# Patient Record
Sex: FEMALE
[De-identification: names, ages, dates, MRNs, and addresses within clinical notes are randomized per-mention and may not be internally consistent; named-entity substitution may affect disease eponyms.]

---

## 2024-04-10 PROBLEM — Z00.00 ENCOUNTER FOR PREVENTIVE HEALTH EXAMINATION: Status: ACTIVE | Noted: 2024-04-10

## 2024-04-15 PROBLEM — I10 HTN (HYPERTENSION): Status: ACTIVE | Noted: 2024-04-15

## 2024-04-15 PROBLEM — E55.9 VITAMIN D DEFICIENCY: Status: ACTIVE | Noted: 2024-04-15

## 2024-04-15 PROBLEM — R73.03 PRE-DIABETES: Status: ACTIVE | Noted: 2024-04-15

## 2024-04-15 PROBLEM — H26.9 CATARACT: Status: ACTIVE | Noted: 2024-04-15

## 2024-04-15 PROBLEM — K82.4 GALLBLADDER POLYP: Status: ACTIVE | Noted: 2024-04-15

## 2024-04-15 RX ORDER — BIOTIN 10000 MCG
10 CAPSULE ORAL
Refills: 0 | Status: ACTIVE | COMMUNITY

## 2024-04-15 RX ORDER — PENCICLOVIR 10 MG/G
1 CREAM TOPICAL
Refills: 0 | Status: ACTIVE | COMMUNITY

## 2024-04-15 RX ORDER — CHOLECALCIFEROL (VITAMIN D3) 1250 MCG
1.25 MG CAPSULE ORAL
Refills: 0 | Status: ACTIVE | COMMUNITY

## 2024-04-22 ENCOUNTER — APPOINTMENT (OUTPATIENT)
Dept: SURGICAL ONCOLOGY | Facility: CLINIC | Age: 64
End: 2024-04-22
Payer: COMMERCIAL

## 2024-04-22 DIAGNOSIS — E55.9 VITAMIN D DEFICIENCY, UNSPECIFIED: ICD-10-CM

## 2024-04-22 DIAGNOSIS — K82.4 CHOLESTEROLOSIS OF GALLBLADDER: ICD-10-CM

## 2024-04-22 DIAGNOSIS — R73.03 PREDIABETES.: ICD-10-CM

## 2024-04-22 DIAGNOSIS — H26.9 UNSPECIFIED CATARACT: ICD-10-CM

## 2024-04-22 DIAGNOSIS — I10 ESSENTIAL (PRIMARY) HYPERTENSION: ICD-10-CM

## 2024-04-22 PROBLEM — E78.5 HLD (HYPERLIPIDEMIA): Status: ACTIVE | Noted: 2024-04-22

## 2024-04-24 ENCOUNTER — APPOINTMENT (OUTPATIENT)
Dept: ENDOCRINOLOGY | Facility: CLINIC | Age: 64
End: 2024-04-24

## 2024-04-29 ENCOUNTER — APPOINTMENT (OUTPATIENT)
Dept: SURGICAL ONCOLOGY | Facility: CLINIC | Age: 64
End: 2024-04-29
Payer: COMMERCIAL

## 2024-04-29 DIAGNOSIS — E78.5 HYPERLIPIDEMIA, UNSPECIFIED: ICD-10-CM

## 2024-05-08 ENCOUNTER — APPOINTMENT (OUTPATIENT)
Dept: ENDOCRINOLOGY | Facility: CLINIC | Age: 64
End: 2024-05-08
Payer: COMMERCIAL

## 2024-05-08 VITALS
HEIGHT: 64 IN | HEART RATE: 69 BPM | BODY MASS INDEX: 22.02 KG/M2 | DIASTOLIC BLOOD PRESSURE: 74 MMHG | WEIGHT: 129 LBS | TEMPERATURE: 98.2 F | SYSTOLIC BLOOD PRESSURE: 131 MMHG | OXYGEN SATURATION: 96 %

## 2024-05-08 DIAGNOSIS — E04.1 NONTOXIC SINGLE THYROID NODULE: ICD-10-CM

## 2024-05-08 DIAGNOSIS — M81.0 AGE-RELATED OSTEOPOROSIS W/OUT CURRENT PATHOLOGICAL FRACTURE: ICD-10-CM

## 2024-05-08 DIAGNOSIS — Z13.820 ENCOUNTER FOR SCREENING FOR OSTEOPOROSIS: ICD-10-CM

## 2024-05-08 PROCEDURE — 99204 OFFICE O/P NEW MOD 45 MIN: CPT

## 2024-05-08 RX ORDER — CALCIUM CARBONATE/VITAMIN D3 600 MG-20
600-125 TABLET ORAL
Refills: 0 | Status: ACTIVE | COMMUNITY

## 2024-05-08 RX ORDER — RALOXIFENE HYDROCHLORIDE 60 MG/1
60 TABLET, FILM COATED ORAL
Refills: 0 | Status: DISCONTINUED | COMMUNITY
End: 2024-05-08

## 2024-05-08 NOTE — HISTORY OF PRESENT ILLNESS
[FreeTextEntry1] : 63 year old female here for evaluation of right sided thyroid nodule   Patient previously saw Dr. Antonieta Rodriguez. ( [facility])  She felt a lump in her neck in 2020 and then later had an US last in 2020. Most recent in 01/2024   Most recent US in from 01/2024:  0.9 cm solid isoechoic nodule in the posterior midpole  0.6 cm spongiform nodule in the lower pole      -She does have many family members with thyroid nodules- sister and mother  -No family history of thyroid cancer -No previous head or neck radiation exposure   -No compressive neck symptoms  TSH of 0.933, FT4: 1.05    Bone disease evaluation:  FRAX osteoporosis risk factors:   Hx of height loss? No Current smoker? No Previous fracture?  No FHx of hip fracture in parent? No Glucocorticoid use?  No Rheumatoid arthritis?  No Alcohol intake: No    Calcium intake: -Diet: Dairy products  -Supplement: Caltrate   Hormone-replacement therapy: No  LMP: age 50 Bone-active drugs: No Hx of kidney stones? No

## 2024-05-08 NOTE — PHYSICAL EXAM
[Alert] : alert [Well Nourished] : well nourished [No Acute Distress] : no acute distress [Normal Sclera/Conjunctiva] : normal sclera/conjunctiva [PERRL] : pupils equal, round and reactive to light [Normal Outer Ear/Nose] : the ears and nose were normal in appearance [Normal TMs] : both tympanic membranes were normal [No Neck Mass] : no neck mass was observed [Thyroid Not Enlarged] : the thyroid was not enlarged [No Respiratory Distress] : no respiratory distress [Clear to Auscultation] : lungs were clear to auscultation bilaterally [Normal S1, S2] : normal S1 and S2 [Normal Rate] : heart rate was normal [Regular Rhythm] : with a regular rhythm [Normal Bowel Sounds] : normal bowel sounds [Soft] : abdomen soft [Normal Gait] : normal gait [No Joint Swelling] : no joint swelling seen [No Rash] : no rash [No Skin Lesions] : no skin lesions [Oriented x3] : oriented to person, place, and time [Normal Insight/Judgement] : insight and judgment were intact

## 2024-05-08 NOTE — REVIEW OF SYSTEMS
[Fatigue] : no fatigue [Decreased Appetite] : appetite not decreased [Recent Weight Gain (___ Lbs)] : no recent weight gain [Recent Weight Loss (___ Lbs)] : no recent weight loss [Visual Field Defect] : no visual field defect [Dry Eyes] : no dryness [Dysphagia] : no dysphagia [Dysphonia] : no dysphonia [Nasal Congestion] : no nasal congestion [Chest Pain] : no chest pain [Palpitations] : no palpitations [Shortness Of Breath] : no shortness of breath [Nausea] : no nausea [Vomiting] : no vomiting [Polyuria] : no polyuria [Irregular Menses] : regular menses [Joint Pain] : no joint pain [Muscle Weakness] : no muscle weakness [Acanthosis] : no acanthosis  [Acne] : no acne [Headaches] : no headaches [Tremors] : no tremors [Depression] : no depression [Polydipsia] : no polydipsia [Easy Bleeding] : no ~M tendency for easy bleeding

## 2024-05-08 NOTE — ASSESSMENT
[FreeTextEntry1] : 63 year old female here for evaluation of right sided thyroid nodule and osteoporosis   Right sided Nodules: -0.9 cm solid isoechoic nodule and 0.6 cm spongiform nodule  -Both nodules do not meet FNA criteria -No high-risk history and no compressive symptoms  -Will continue observation, repeat US   Osteoporosis -Will obtain recent DEXA  -Continue calcium/Vitamin D supplementation -Will start fosamax after reviewing   -Follow up in 12/2024 for repeat appt/US

## 2024-05-20 ENCOUNTER — APPOINTMENT (OUTPATIENT)
Dept: SURGICAL ONCOLOGY | Facility: CLINIC | Age: 64
End: 2024-05-20
Payer: COMMERCIAL

## 2024-05-20 VITALS
HEIGHT: 64 IN | HEART RATE: 72 BPM | TEMPERATURE: 97.9 F | SYSTOLIC BLOOD PRESSURE: 126 MMHG | RESPIRATION RATE: 16 BRPM | OXYGEN SATURATION: 98 % | DIASTOLIC BLOOD PRESSURE: 74 MMHG | WEIGHT: 132 LBS | BODY MASS INDEX: 22.53 KG/M2

## 2024-05-20 DIAGNOSIS — Z86.69 PERSONAL HISTORY OF OTHER DISEASES OF THE NERVOUS SYSTEM AND SENSE ORGANS: ICD-10-CM

## 2024-05-20 DIAGNOSIS — Z83.79 FAMILY HISTORY OF OTHER DISEASES OF THE DIGESTIVE SYSTEM: ICD-10-CM

## 2024-05-20 DIAGNOSIS — S16.1XXA STRAIN OF MUSCLE, FASCIA AND TENDON AT NECK LEVEL, INITIAL ENCOUNTER: ICD-10-CM

## 2024-05-20 DIAGNOSIS — Z86.39 PERSONAL HISTORY OF OTHER ENDOCRINE, NUTRITIONAL AND METABOLIC DISEASE: ICD-10-CM

## 2024-05-20 DIAGNOSIS — Z80.1 FAMILY HISTORY OF MALIGNANT NEOPLASM OF TRACHEA, BRONCHUS AND LUNG: ICD-10-CM

## 2024-05-20 DIAGNOSIS — I10 ESSENTIAL (PRIMARY) HYPERTENSION: ICD-10-CM

## 2024-05-20 PROCEDURE — 99244 OFF/OP CNSLTJ NEW/EST MOD 40: CPT

## 2024-05-20 NOTE — HISTORY OF PRESENT ILLNESS
[de-identified] : Patient: Jackie Ray MRN: [id number] Referring Doctor: Dr. Sofi Carrillo  Date of Visit: 5/20/24  Diagnosis: Gallbladder Polyp  62 yo F with pmhx HLD, pre-diabetes, HTN, cataracts, thyroid nodules, Vit D deficiency, presents for evaluation of her gallbladder polyp. Since 2020, polyp has grown from 0.6 cm to 0.8 cm.  Currently, Ms. RAY endorses she feels well. Patient denies changes in bowel habits, appetite changes, abdominal pain, nausea, vomiting, unintentional weight loss, fevers or chills.  Functional status: The patient is able to ambulate and function fully without fatigue or dyspnea.  Work Up: 11/23/2020 - US: 0.6 cm gallbladder polyp, hyperechoic liver  1/12/24- CT: hepatic steatosis  1/26/24 - US: Increase in size of the gallbladder polyp (0.8 cm) stable mildly echogenic liver which can be seen in hepatic parenchymal disease and or/hepatic steatosis. subcentimeter cyst 2/24/24 - Tbili 0.6, AST 23, ALT 14, Alk Phos 81

## 2024-05-20 NOTE — ASSESSMENT
[FreeTextEntry1] : Ms. Coyne is a 63 y.o. woman, who has been incidentally diagnosed with a polyp of the gallbladder back in 2020. She has no symptoms.  An US in January 2024 showed an increase in size from 6 to 8 mm. The patient does not have h/o sclerosing cholangitis, is not of [place] ethnicity, and there is no focal thickness of the gallbladder noted on US or CT. She is, however, older than 60. The general recommendation is to remove polyps > 5 mm in patients > 60 y.o. We discussed risks of cholecystectomy, including bleeding, infection, injury to the CBD. The benefit of course is that a polyp with malignant potential would be removed.   The patient would like to think about all of this info before committing to a cholecystectomy.  She will call my office in a few days with a decision.  I told her in case she does not wish to proceed, an ultrasound should be repeated in July.

## 2024-05-20 NOTE — HISTORY OF PRESENT ILLNESS
[de-identified] : Patient: Jackie Ray MRN: [id number] Referring Doctor: Dr. Sofi Carrillo  Date of Visit: 5/20/24  Diagnosis: Gallbladder Polyp  64 yo F with pmhx HLD, pre-diabetes, HTN, cataracts, thyroid nodules, Vit D deficiency, presents for evaluation of her gallbladder polyp. Since 2020, polyp has grown from 0.6 cm to 0.8 cm.  Currently, Ms. RAY endorses she feels well. Patient denies changes in bowel habits, appetite changes, abdominal pain, nausea, vomiting, unintentional weight loss, fevers or chills.  Functional status: The patient is able to ambulate and function fully without fatigue or dyspnea.  Work Up: 11/23/2020 - US: 0.6 cm gallbladder polyp, hyperechoic liver  1/12/24- CT: hepatic steatosis  1/26/24 - US: Increase in size of the gallbladder polyp (0.8 cm) stable mildly echogenic liver which can be seen in hepatic parenchymal disease and or/hepatic steatosis. subcentimeter cyst 2/24/24 - Tbili 0.6, AST 23, ALT 14, Alk Phos 81

## 2024-05-20 NOTE — ASSESSMENT
[FreeTextEntry1] : Ms. Coyne is a 63 y.o. woman, who has been incidentally diagnosed with a polyp of the gallbladder back in 2020. She has no symptoms.  An US in January 2024 showed an increase in size from 6 to 8 mm. The patient does not have h/o sclerosing cholangitis, is not of [place] ethnicity, and there is no focal thickness of the gallbladder noted on US or CT. She is, however, older than 60. The general recommendation is to remove polyps > 5 mm in patients > 60 y.o. We discussed risks of cholecystectomy, including bleeding, infection, injury to the CBD. The benefit of course is that a polyp with malignant potential would be removed.   The patient would like to think about all of this info before committing to a cholecystectomy.  She will call my office in a few days with a decision.  I told her in case she does not wish to proceed, an ultrasound should be repeated in July.

## 2024-05-20 NOTE — PHYSICAL EXAM
[Normal] : supple, no neck mass and thyroid not enlarged [Normal Female] : normal external genitalia, urethral meatus without lesions or discharge. On bimanual exam uterus, adnexa, parametria all normal without any discrete masses. [Normal] : oriented to person, place and time, with appropriate affect [de-identified] : Defer [FreeTextEntry1] : Defer

## 2024-05-20 NOTE — PHYSICAL EXAM
[Normal] : supple, no neck mass and thyroid not enlarged [Normal Female] : normal external genitalia, urethral meatus without lesions or discharge. On bimanual exam uterus, adnexa, parametria all normal without any discrete masses. [Normal] : oriented to person, place and time, with appropriate affect [de-identified] : Defer [FreeTextEntry1] : Defer

## 2024-06-06 RX ORDER — ALENDRONATE SODIUM 70 MG/1
70 TABLET ORAL
Qty: 1 | Refills: 2 | Status: ACTIVE | COMMUNITY
Start: 2024-06-06 | End: 1900-01-01

## 2024-07-18 DIAGNOSIS — K82.4 CHOLESTEROLOSIS OF GALLBLADDER: ICD-10-CM

## 2024-08-02 RX ORDER — DEXTROSE MONOHYDRATE, SODIUM CHLORIDE, SODIUM LACTATE, CALCIUM CHLORIDE, MAGNESIUM CHLORIDE 1.5; 538; 448; 18.4; 5.08 G/100ML; MG/100ML; MG/100ML; MG/100ML; MG/100ML
1000 SOLUTION INTRAPERITONEAL
Refills: 0 | Status: DISCONTINUED | OUTPATIENT
Start: 2024-08-07 | End: 2024-08-07

## 2024-08-06 NOTE — ASU PATIENT PROFILE, ADULT - NSICDXPASTSURGICALHX_GEN_ALL_CORE_FT
PAST SURGICAL HISTORY:  No significant past surgical history PAST SURGICAL HISTORY:  H/O breast biopsy     H/O  section

## 2024-08-06 NOTE — ASU PATIENT PROFILE, ADULT - NSICDXPASTMEDICALHX_GEN_ALL_CORE_FT
PAST MEDICAL HISTORY:  No pertinent past medical history PAST MEDICAL HISTORY:  Thyroid disease

## 2024-08-06 NOTE — ASU PATIENT PROFILE, ADULT - NS PREOP UNDERSTANDS INFO
Spoke to patient to be NPO/No solid foods after  12Mn, allow to drink water or  apple juice till  8-9 am , ress comfortable, no lotion, no jewelry, no nail polish ,  Bring ID photo and insurance cards,  escort arranged,  address and  telephone  given . Natasha/yes

## 2024-08-06 NOTE — ASU PATIENT PROFILE, ADULT - FALL HARM RISK - UNIVERSAL INTERVENTIONS
Bed in lowest position, wheels locked, appropriate side rails in place/Call bell, personal items and telephone in reach/Instruct patient to call for assistance before getting out of bed or chair/Non-slip footwear when patient is out of bed/[place] to call system/Physically safe environment - no spills, clutter or unnecessary equipment/Purposeful Proactive Rounding/Room/bathroom lighting operational, light cord in reach

## 2024-08-07 ENCOUNTER — OUTPATIENT (OUTPATIENT)
Dept: OUTPATIENT SERVICES | Facility: HOSPITAL | Age: 64
LOS: 1 days | Discharge: HOME | End: 2024-08-07

## 2024-08-07 VITALS
SYSTOLIC BLOOD PRESSURE: 152 MMHG | OXYGEN SATURATION: 99 % | WEIGHT: 128.31 LBS | HEART RATE: 69 BPM | RESPIRATION RATE: 16 BRPM | DIASTOLIC BLOOD PRESSURE: 72 MMHG | TEMPERATURE: 97 F | HEIGHT: 64 IN

## 2024-08-07 VITALS
RESPIRATION RATE: 16 BRPM | SYSTOLIC BLOOD PRESSURE: 122 MMHG | DIASTOLIC BLOOD PRESSURE: 71 MMHG | TEMPERATURE: 97 F | OXYGEN SATURATION: 98 % | HEART RATE: 68 BPM

## 2024-08-07 DIAGNOSIS — Z98.891 HISTORY OF UTERINE SCAR FROM PREVIOUS SURGERY: Chronic | ICD-10-CM

## 2024-08-07 DIAGNOSIS — Z98.890 OTHER SPECIFIED POSTPROCEDURAL STATES: Chronic | ICD-10-CM

## 2024-08-07 DEVICE — LENS IOL TECNIS SMPLCTY 1PC CLR MONO DCB0000 22.0D
Type: IMPLANTABLE DEVICE | Site: RIGHT | Status: NON-FUNCTIONAL
Removed: 2024-08-07

## 2024-08-07 RX ORDER — ACETAMINOPHEN 500 MG
650 TABLET ORAL ONCE
Refills: 0 | Status: DISCONTINUED | OUTPATIENT
Start: 2024-08-07 | End: 2024-08-07

## 2024-08-07 RX ORDER — OFLOXACIN 0.3 %
1 DROPS OPHTHALMIC (EYE)
Refills: 0 | Status: COMPLETED | OUTPATIENT
Start: 2024-08-07 | End: 2024-08-07

## 2024-08-07 RX ORDER — CYCLOPENTOLATE HCL 1 %
1 DROPS OPHTHALMIC (EYE)
Refills: 0 | Status: COMPLETED | OUTPATIENT
Start: 2024-08-07 | End: 2024-08-07

## 2024-08-07 RX ORDER — TROPICAMIDE 1 %
1 DROPS OPHTHALMIC (EYE)
Refills: 0 | Status: COMPLETED | OUTPATIENT
Start: 2024-08-07 | End: 2024-08-07

## 2024-08-07 RX ORDER — ONDANSETRON HCL/PF 4 MG/2 ML
4 VIAL (ML) INJECTION ONCE
Refills: 0 | Status: DISCONTINUED | OUTPATIENT
Start: 2024-08-07 | End: 2024-08-07

## 2024-08-07 RX ORDER — KETOROLAC TROMETHAMINE 0.5 %
1 DROPS OPHTHALMIC (EYE)
Refills: 0 | Status: COMPLETED | OUTPATIENT
Start: 2024-08-07 | End: 2024-08-07

## 2024-08-07 RX ORDER — PHENYLEPHRINE HYDROCHLORIDE 25 MG/ML
1 SOLUTION/ DROPS OPHTHALMIC
Refills: 0 | Status: COMPLETED | OUTPATIENT
Start: 2024-08-07 | End: 2024-08-07

## 2024-08-07 RX ADMIN — Medication 1 DROP(S): at 13:35

## 2024-08-07 RX ADMIN — Medication 1 DROP(S): at 13:45

## 2024-08-07 RX ADMIN — Medication 1 DROP(S): at 13:40

## 2024-08-07 RX ADMIN — PHENYLEPHRINE HYDROCHLORIDE 1 DROP(S): 25 SOLUTION/ DROPS OPHTHALMIC at 13:45

## 2024-08-07 RX ADMIN — PHENYLEPHRINE HYDROCHLORIDE 1 DROP(S): 25 SOLUTION/ DROPS OPHTHALMIC at 13:35

## 2024-08-07 RX ADMIN — PHENYLEPHRINE HYDROCHLORIDE 1 DROP(S): 25 SOLUTION/ DROPS OPHTHALMIC at 13:40

## 2024-08-07 NOTE — OPERATIVE REPORT - OPERATIVE RPOSRT DETAILS
Date of Procedure:8/7/2024    Surgeon:  Yariel Coleman    Diagnosis:  CATARACT RIGHT    Procedure:  CATARACT REMOVAL with IOL RIGHT    Anesthesia: MAC    Complications: None      The patient was prepped and draped in the usual sterile fashion. A lid speculum is placed in the eye. Berry scissors are used to dissect conjunctiva and tenons from the superior limbus for one clock hour. Wet field cautery is used to obtain hemostasis. A crescent blade is used to create a circum-limbal groove 2mm posterior to the surgical limbus at 12 o’clock and is dissected at fifty percent scleral thickness just to clear cornea. A fifteen degree super-blade is used to create a paracentesis at the ten and two o’clock positions. The chamber is then entered with a 2.4mm blade through the scleral flap. Healon is used to reconstitute the anterior chamber. A capsulorhexus is then created with a cystitome for 360 degrees. The dissected capsule is removed with a curved forceps and hydrodissection is performed using BSS on an air needle. Next, the lens is removed with torsional ultrasound with residual cortex removed with the I/A unit.    Next, with the anterior chamber reconstituted with healon, a  is used to insert a foldable IOL into the capsular bag. The lens is rotated into central position and the haptics are seen to extend fully. Residual Healon is removed with the I/A unit  and the pupil is brought down with Miochol. One 10-0 nylon suture is used to close the scleral wound and one suture is used to close conjunctiva and tenons. The lid speculum is removed and pilocarpine 2% with Tobradex ointment is placed on the cornea and the eye is closed, patched and shielded.    The patient is transferred to the recovery in baseline condition.
monthly or less

## 2024-08-07 NOTE — PRE-ANESTHESIA EVALUATION ADULT - NSANTHOSAYNRD_GEN_A_CORE
No. ROSEMARY screening performed.  STOP BANG Legend: 0-2 = LOW Risk; 3-4 = INTERMEDIATE Risk; 5-8 = HIGH Risk

## 2024-08-21 PROBLEM — E07.9 DISORDER OF THYROID, UNSPECIFIED: Chronic | Status: ACTIVE | Noted: 2024-08-07

## 2024-09-05 ENCOUNTER — NON-APPOINTMENT (OUTPATIENT)
Age: 64
End: 2024-09-05

## 2024-09-09 ENCOUNTER — APPOINTMENT (OUTPATIENT)
Dept: SURGICAL ONCOLOGY | Facility: CLINIC | Age: 64
End: 2024-09-09

## 2024-09-09 NOTE — HISTORY OF PRESENT ILLNESS
[de-identified] : Patient Name: MEG RAY  MRN: [id number]  Nora MRN: Referring Provider: Dr. Sofi Carrillo Date: 09/04/2024   Diagnosis: GB Polyp  She presents for a follow up of a gallbladder polyp. 11/23/2020 - US: 0.6 cm gallbladder polyp, hyperechoic liver 1/12/24- CT: hepatic steatosis 1/26/24 - US: Increase in size of the gallbladder polyp (0.8 cm) stable mildly echogenic liver which can be seen in hepatic parenchymal disease and or/hepatic steatosis. subcentimeter cyst 2/24/24 - Tbili 0.6, AST 23, ALT 14, Alk Phos 81.  Currently, Ms. RAY ~  ~  abdominal pain and discomfort, ~  ~ having decreased appetite, and ~  ~ nausea or vomiting. She ~  ~ changes in bowel habits and ~  ~ recent unintentional weight loss. She ~  ~ fevers, chills, or night sweats.  She ~  ~ genetic testing.

## 2024-09-09 NOTE — HISTORY OF PRESENT ILLNESS
[de-identified] : Patient Name: MEG RAY  MRN: [id number]  Nora MRN: Referring Provider: Dr. Sofi Carrillo Date: 09/04/2024   Diagnosis: GB Polyp  She presents for a follow up of a gallbladder polyp. 11/23/2020 - US: 0.6 cm gallbladder polyp, hyperechoic liver 1/12/24- CT: hepatic steatosis 1/26/24 - US: Increase in size of the gallbladder polyp (0.8 cm) stable mildly echogenic liver which can be seen in hepatic parenchymal disease and or/hepatic steatosis. subcentimeter cyst 2/24/24 - Tbili 0.6, AST 23, ALT 14, Alk Phos 81.  Currently, Ms. RAY ~  ~  abdominal pain and discomfort, ~  ~ having decreased appetite, and ~  ~ nausea or vomiting. She ~  ~ changes in bowel habits and ~  ~ recent unintentional weight loss. She ~  ~ fevers, chills, or night sweats.  She ~  ~ genetic testing.

## 2025-01-10 ENCOUNTER — APPOINTMENT (OUTPATIENT)
Dept: ENDOCRINOLOGY | Facility: CLINIC | Age: 65
End: 2025-01-10
Payer: COMMERCIAL

## 2025-01-10 VITALS
BODY MASS INDEX: 23.39 KG/M2 | SYSTOLIC BLOOD PRESSURE: 148 MMHG | DIASTOLIC BLOOD PRESSURE: 74 MMHG | WEIGHT: 137 LBS | HEART RATE: 75 BPM | HEIGHT: 64 IN

## 2025-01-10 DIAGNOSIS — E04.1 NONTOXIC SINGLE THYROID NODULE: ICD-10-CM

## 2025-01-10 DIAGNOSIS — M81.0 AGE-RELATED OSTEOPOROSIS W/OUT CURRENT PATHOLOGICAL FRACTURE: ICD-10-CM

## 2025-01-10 PROCEDURE — 76536 US EXAM OF HEAD AND NECK: CPT

## 2025-01-10 PROCEDURE — 36415 COLL VENOUS BLD VENIPUNCTURE: CPT

## 2025-01-10 PROCEDURE — 99214 OFFICE O/P EST MOD 30 MIN: CPT

## 2025-01-11 LAB
25(OH)D3 SERPL-MCNC: 18.8 NG/ML
ALBUMIN SERPL ELPH-MCNC: 4 G/DL
ANION GAP SERPL CALC-SCNC: 10 MMOL/L
BUN SERPL-MCNC: 16 MG/DL
CALCIUM SERPL-MCNC: 9.4 MG/DL
CALCIUM SERPL-MCNC: 9.4 MG/DL
CHLORIDE SERPL-SCNC: 103 MMOL/L
CO2 SERPL-SCNC: 29 MMOL/L
CREAT SERPL-MCNC: 0.52 MG/DL
EGFR: 104 ML/MIN/1.73M2
GLUCOSE SERPL-MCNC: 108 MG/DL
PARATHYROID HORMONE INTACT: 28 PG/ML
POTASSIUM SERPL-SCNC: 4.4 MMOL/L
SODIUM SERPL-SCNC: 141 MMOL/L
T4 FREE SERPL-MCNC: 1.1 NG/DL
TSH SERPL-ACNC: 3.7 UIU/ML

## 2025-06-24 NOTE — ASU PATIENT PROFILE, ADULT - CAREGIVER PHONE NUMBER
Retention Suture Text: Retention sutures were placed to support the closure and prevent dehiscence. [phone]

## 2025-06-24 NOTE — ASU PATIENT PROFILE, ADULT - NSICDXPASTMEDICALHX_GEN_ALL_CORE_FT
PAST MEDICAL HISTORY:  Thyroid disease      PAST MEDICAL HISTORY:  Gallbladder polyp     Migraines     Osteoporosis     Thyroid disease

## 2025-06-24 NOTE — ASU PATIENT PROFILE, ADULT - NSICDXPASTSURGICALHX_GEN_ALL_CORE_FT
PAST SURGICAL HISTORY:  H/O breast biopsy     H/O  section     S/P cataract surgery right     PAST SURGICAL HISTORY:  H/O breast biopsy right    H/O  section     S/P cataract surgery right

## 2025-06-24 NOTE — ASU PATIENT PROFILE, ADULT - FALL HARM RISK - UNIVERSAL INTERVENTIONS
Bed in lowest position, wheels locked, appropriate side rails in place/Call bell, personal items and telephone in reach/Instruct patient to call for assistance before getting out of bed or chair/Non-slip footwear when patient is out of bed/[place] to call system/Physically safe environment - no spills, clutter or unnecessary equipment/Purposeful Proactive Rounding/Room/bathroom lighting operational, light cord in reach

## 2025-06-25 ENCOUNTER — OUTPATIENT (OUTPATIENT)
Dept: OUTPATIENT SERVICES | Facility: HOSPITAL | Age: 65
LOS: 1 days | Discharge: HOME | End: 2025-06-25

## 2025-06-25 VITALS
RESPIRATION RATE: 16 BRPM | OXYGEN SATURATION: 98 % | HEART RATE: 69 BPM | DIASTOLIC BLOOD PRESSURE: 75 MMHG | TEMPERATURE: 98 F | SYSTOLIC BLOOD PRESSURE: 141 MMHG

## 2025-06-25 VITALS
HEIGHT: 64 IN | DIASTOLIC BLOOD PRESSURE: 77 MMHG | OXYGEN SATURATION: 99 % | SYSTOLIC BLOOD PRESSURE: 149 MMHG | WEIGHT: 132.72 LBS | TEMPERATURE: 98 F | RESPIRATION RATE: 16 BRPM | HEART RATE: 69 BPM

## 2025-06-25 DIAGNOSIS — Z98.890 OTHER SPECIFIED POSTPROCEDURAL STATES: Chronic | ICD-10-CM

## 2025-06-25 DIAGNOSIS — Z98.891 HISTORY OF UTERINE SCAR FROM PREVIOUS SURGERY: Chronic | ICD-10-CM

## 2025-06-25 DIAGNOSIS — Z98.49 CATARACT EXTRACTION STATUS, UNSPECIFIED EYE: Chronic | ICD-10-CM

## 2025-06-25 DEVICE — LENS IOL TECNIS SMPLCTY 1PC CLR MONO DCB0000 22.5D
Type: IMPLANTABLE DEVICE | Site: LEFT | Status: NON-FUNCTIONAL
Removed: 2025-06-25

## 2025-06-25 RX ORDER — TROPICAMIDE
1 POWDER (GRAM) MISCELLANEOUS
Refills: 0 | Status: COMPLETED | OUTPATIENT
Start: 2025-06-25 | End: 2025-06-25

## 2025-06-25 RX ORDER — SODIUM CHLORIDE 9 G/1000ML
1000 INJECTION, SOLUTION INTRAVENOUS
Refills: 0 | Status: DISCONTINUED | OUTPATIENT
Start: 2025-06-25 | End: 2025-06-25

## 2025-06-25 RX ORDER — OFLOXACIN 3 MG/ML
1 SOLUTION OPHTHALMIC
Refills: 0 | Status: COMPLETED | OUTPATIENT
Start: 2025-06-25 | End: 2025-06-25

## 2025-06-25 RX ORDER — CHLOROQUINE PHOSPHATE
1 POWDER (GRAM) MISCELLANEOUS
Refills: 0 | Status: COMPLETED | OUTPATIENT
Start: 2025-06-25 | End: 2025-06-25

## 2025-06-25 RX ORDER — KETOROLAC TROMETHAMINE 5 MG/ML
1 SOLUTION/ DROPS OPHTHALMIC
Refills: 0 | Status: COMPLETED | OUTPATIENT
Start: 2025-06-25 | End: 2025-06-25

## 2025-06-25 RX ORDER — SODIUM CHLORIDE 9 G/1000ML
500 INJECTION, SOLUTION INTRAVENOUS
Refills: 0 | Status: DISCONTINUED | OUTPATIENT
Start: 2025-06-25 | End: 2025-06-25

## 2025-06-25 RX ORDER — ONDANSETRON HCL/PF 4 MG/2 ML
4 VIAL (ML) INJECTION ONCE
Refills: 0 | Status: DISCONTINUED | OUTPATIENT
Start: 2025-06-25 | End: 2025-06-25

## 2025-06-25 RX ORDER — PHENYLEPHRINE HYDROCHLORIDE 25 MG/ML
1 SOLUTION OPHTHALMIC
Refills: 0 | Status: COMPLETED | OUTPATIENT
Start: 2025-06-25 | End: 2025-06-25

## 2025-06-25 RX ADMIN — KETOROLAC TROMETHAMINE 1 DROP(S): 5 SOLUTION/ DROPS OPHTHALMIC at 14:15

## 2025-06-25 RX ADMIN — Medication 1 DROP(S): at 14:10

## 2025-06-25 RX ADMIN — PHENYLEPHRINE HYDROCHLORIDE 1 DROP(S): 25 SOLUTION OPHTHALMIC at 14:15

## 2025-06-25 RX ADMIN — KETOROLAC TROMETHAMINE 1 DROP(S): 5 SOLUTION/ DROPS OPHTHALMIC at 14:10

## 2025-06-25 RX ADMIN — Medication 1 DROP(S): at 14:15

## 2025-06-25 RX ADMIN — Medication 1 DROP(S): at 14:20

## 2025-06-25 RX ADMIN — PHENYLEPHRINE HYDROCHLORIDE 1 DROP(S): 25 SOLUTION OPHTHALMIC at 14:10

## 2025-06-25 RX ADMIN — OFLOXACIN 1 DROP(S): 3 SOLUTION OPHTHALMIC at 14:10

## 2025-06-25 RX ADMIN — OFLOXACIN 1 DROP(S): 3 SOLUTION OPHTHALMIC at 14:20

## 2025-06-25 RX ADMIN — KETOROLAC TROMETHAMINE 1 DROP(S): 5 SOLUTION/ DROPS OPHTHALMIC at 14:20

## 2025-06-25 RX ADMIN — OFLOXACIN 1 DROP(S): 3 SOLUTION OPHTHALMIC at 14:15

## 2025-06-25 RX ADMIN — PHENYLEPHRINE HYDROCHLORIDE 1 DROP(S): 25 SOLUTION OPHTHALMIC at 14:20

## 2025-06-25 NOTE — OPERATIVE REPORT - OPERATIVE RPOSRT DETAILS
Date of Procedure: 6/25/2025    Surgeon:  Yariel Coleman    Diagnosis:  CATARACT LEFT    Procedure:  CATARACT REMOVAL with IOL LEFT    Anesthesia: MAC    Complications: None      The patient was prepped and draped in the usual sterile fashion. A lid speculum is placed in the eye. Berry scissors are used to dissect conjunctiva and tenons from the superior limbus for one clock hour. Wet field cautery is used to obtain hemostasis. A crescent blade is used to create a circum-limbal groove 2mm posterior to the surgical limbus at 12 o’clock and is dissected at fifty percent scleral thickness just to clear cornea. A fifteen degree super-blade is used to create a paracentesis at the ten and two o’clock positions. The chamber is then entered with a 2.4mm blade through the scleral flap. Healon is used to reconstitute the anterior chamber. A capsulorhexus is then created with a cystitome for 360 degrees. The dissected capsule is removed with a curved forceps and hydrodissection is performed using BSS on an air needle. Next, the lens is removed with torsional ultrasound with residual cortex removed with the I/A unit.    Next, with the anterior chamber reconstituted with healon, a  is used to insert a foldable IOL into the capsular bag. The lens is rotated into central position and the haptics are seen to extend fully. Residual Healon is removed with the I/A unit  and the pupil is brought down with Miochol. One 10-0 nylon suture is used to close the scleral wound and one suture is used to close conjunctiva and tenons. The lid speculum is removed and pilocarpine 2% with Tobradex ointment is placed on the cornea and the eye is closed, patched and shielded.    The patient is transferred to the recovery in baseline condition.

## (undated) DEVICE — PACK CENTURION 2.4MM

## (undated) DEVICE — SUT ETHILON 10-0 12" TG160-4

## (undated) DEVICE — GLV 7.5 PROTEXIS (BLUE)

## (undated) DEVICE — DRAPE MICROSCOPE KNOB COVER SMALL (2 PCS)

## (undated) DEVICE — ELCTR BIPOLAR CORD J&J 12FT DISP

## (undated) DEVICE — KNIFE ALCON CRESCENT ANGLED BEVEL UP 2.3MM (PINK)

## (undated) DEVICE — KNIFE ALCON STANDARD FULL HANDLE 15 DEG (PINK)

## (undated) DEVICE — TIP OZIL 12 DEGREE MINI FLARE

## (undated) DEVICE — Device

## (undated) DEVICE — ELCTR ERASER BI-P BVL 45DEG 18G